# Patient Record
Sex: FEMALE | URBAN - METROPOLITAN AREA
[De-identification: names, ages, dates, MRNs, and addresses within clinical notes are randomized per-mention and may not be internally consistent; named-entity substitution may affect disease eponyms.]

---

## 2023-05-05 ENCOUNTER — APPOINTMENT (RX ONLY)
Dept: URBAN - METROPOLITAN AREA CLINIC 335 | Facility: CLINIC | Age: 38
Setting detail: DERMATOLOGY
End: 2023-05-05

## 2023-05-05 DIAGNOSIS — L65.9 NONSCARRING HAIR LOSS, UNSPECIFIED: ICD-10-CM | Status: INADEQUATELY CONTROLLED

## 2023-05-05 DIAGNOSIS — D18.0 HEMANGIOMA: ICD-10-CM

## 2023-05-05 DIAGNOSIS — D22 MELANOCYTIC NEVI: ICD-10-CM

## 2023-05-05 DIAGNOSIS — B07.8 OTHER VIRAL WARTS: ICD-10-CM | Status: INADEQUATELY CONTROLLED

## 2023-05-05 DIAGNOSIS — L57.8 OTHER SKIN CHANGES DUE TO CHRONIC EXPOSURE TO NONIONIZING RADIATION: ICD-10-CM | Status: INADEQUATELY CONTROLLED

## 2023-05-05 DIAGNOSIS — L81.4 OTHER MELANIN HYPERPIGMENTATION: ICD-10-CM

## 2023-05-05 DIAGNOSIS — L81.1 CHLOASMA: ICD-10-CM

## 2023-05-05 DIAGNOSIS — L82.1 OTHER SEBORRHEIC KERATOSIS: ICD-10-CM | Status: INADEQUATELY CONTROLLED

## 2023-05-05 PROBLEM — D23.72 OTHER BENIGN NEOPLASM OF SKIN OF LEFT LOWER LIMB, INCLUDING HIP: Status: ACTIVE | Noted: 2023-05-05

## 2023-05-05 PROBLEM — D18.01 HEMANGIOMA OF SKIN AND SUBCUTANEOUS TISSUE: Status: ACTIVE | Noted: 2023-05-05

## 2023-05-05 PROBLEM — D22.5 MELANOCYTIC NEVI OF TRUNK: Status: ACTIVE | Noted: 2023-05-05

## 2023-05-05 PROCEDURE — ? TREATMENT REGIMEN

## 2023-05-05 PROCEDURE — ? COUNSELING

## 2023-05-05 PROCEDURE — ? RECOMMENDATIONS

## 2023-05-05 PROCEDURE — ? PRODUCT LINE (REVISION)

## 2023-05-05 PROCEDURE — 99204 OFFICE O/P NEW MOD 45 MIN: CPT

## 2023-05-05 PROCEDURE — ? FULL BODY SKIN EXAM

## 2023-05-05 PROCEDURE — ? DEFER

## 2023-05-05 PROCEDURE — ? ORDER TESTS

## 2023-05-05 PROCEDURE — ? IN-HOUSE DISPENSING PHARMACY

## 2023-05-05 ASSESSMENT — LOCATION DETAILED DESCRIPTION DERM
LOCATION DETAILED: MEDIAL FRONTAL SCALP
LOCATION DETAILED: LEFT ANTERIOR SHOULDER
LOCATION DETAILED: RIGHT RADIAL DORSAL HAND
LOCATION DETAILED: LEFT DISTAL PALMAR INDEX FINGER
LOCATION DETAILED: LEFT DISTAL PALMAR SMALL FINGER
LOCATION DETAILED: EPIGASTRIC SKIN
LOCATION DETAILED: LEFT CENTRAL MALAR CHEEK
LOCATION DETAILED: LEFT MID-UPPER BACK
LOCATION DETAILED: 1ST WEB SPACE RIGHT HAND
LOCATION DETAILED: RIGHT INFERIOR MEDIAL FOREHEAD
LOCATION DETAILED: RIGHT MEDIAL BREAST 2-3:00 REGION
LOCATION DETAILED: RIGHT SUPERIOR MEDIAL MIDBACK
LOCATION DETAILED: RIGHT PROXIMAL ULNAR THUMB

## 2023-05-05 ASSESSMENT — LOCATION SIMPLE DESCRIPTION DERM
LOCATION SIMPLE: RIGHT HAND
LOCATION SIMPLE: RIGHT FOREHEAD
LOCATION SIMPLE: RIGHT BREAST
LOCATION SIMPLE: RIGHT THUMB
LOCATION SIMPLE: RIGHT LOWER BACK
LOCATION SIMPLE: LEFT UPPER BACK
LOCATION SIMPLE: LEFT CHEEK
LOCATION SIMPLE: ABDOMEN
LOCATION SIMPLE: LEFT INDEX FINGER
LOCATION SIMPLE: FRONTAL SCALP
LOCATION SIMPLE: LEFT SHOULDER
LOCATION SIMPLE: LEFT SMALL FINGER

## 2023-05-05 ASSESSMENT — LOCATION ZONE DERM
LOCATION ZONE: FACE
LOCATION ZONE: ARM
LOCATION ZONE: HAND
LOCATION ZONE: FINGER
LOCATION ZONE: TRUNK
LOCATION ZONE: SCALP

## 2023-05-05 NOTE — PROCEDURE: PRODUCT LINE (REVISION)
Product 3 Application Directions: Using a circular motion, massage into skin until fully absorbed. Apply twice daily. To see optimal results, it is recommended to exfoliate twice a week using a loofah or physical exfoliator.

## 2023-05-05 NOTE — PROCEDURE: ORDER TESTS
Billing Type: Third-Party Bill
Performing Laboratory: [id number]
Bill For Surgical Tray: no
Expected Date Of Service: 05/05/2023

## 2023-05-05 NOTE — PROCEDURE: PRODUCT LINE (REVISION)
Product 8 Application Directions: Apply to clean skin prior to moisturizer. Dispense desired amount and use applicator to massage serum onto any fine lines and wrinkles. You will feel a cooling effect from the customized metal tip. Avoid direct eye contact. Use twice daily. NOTE: A thin layer of product is all that is needed. No need for heavy application. (Packaging contains slightly different verbiage.

## 2023-05-05 NOTE — PROCEDURE: PRODUCT LINE (REVISION)
Product 4 Application Directions: Dispense two pumps into palm of hand. Using upward strokes, gently apply onto d?colletage, working your way up the neck to the jawline. Use twice daily

## 2023-05-05 NOTE — PROCEDURE: PRODUCT LINE (REVISION)
Product 5 Application Directions: Glide onto lips in a massaging motion. Use three times daily for optimal results or as needed. Use alone or layered over your favorite lip color

## 2023-05-05 NOTE — PROCEDURE: PRODUCT LINE (REVISION)
Name Of Product 2: [id number]\\nGentle Cleansing Lotion Name Of Product 2: [id number]\\nGentle Cleansing Lotion

## 2023-05-05 NOTE — PROCEDURE: IN-HOUSE DISPENSING PHARMACY
Product 9 Application Directions: Apply to affected area twice a day, three times a week
Product 36 Price/Unit (In Dollars): 0
Product 65 Unit Type: mg
Product 7 Price/Unit (In Dollars): 55
Product 16 Refills: 3
Product 6 Unit Type: ml
Product 11 Amount/Unit (Numbers Only): 30
Product 1 Unit Type: grams
Product 18 Unit Type: tablets
Name Of Product 25: Minocycline
Product 21 Price/Unit (In Dollars): 45
Product 16 Amount/Unit (Numbers Only): 60
Product 11 Refills: 2
Name Of Product 2: Acne moisturizing cream (NIACINAMIDE 4% / TRETINOIN 0.05% / HYALURONIC ACID 0.5% CREAM)
Name Of Product 19: Doxycycline
Name Of Product 9: Dermatitis cream  (CLOBETASOL PROPIONATE 0.05% / NIACINAMIDE 4%)
Product 14 Application Directions: Apply a small amount to face once daily
Product 23 Price/Unit (In Dollars): 10
Product 11 Price/Unit (In Dollars): 20
Product 25 Application Directions: Take 1 capsule twice daily for 1 week
Product 4 Amount/Unit (Numbers Only): 30
Product 19 Application Directions: Take 1 Capsule twice daily
Name Of Product 15: Anti-Fungal Shampoo ( KETOCONAZOLE 2% / SALICYLIC ACID 2% / ZINC PYRITHIONE 0.2%)
Product 2 Application Directions: Apply a thin layer to face every night
Product 8 Application Directions: Apply to affected area twice daily
Product 25 Unit Type: capsules
Name Of Product 13: Rosacea Cream (AZELAIC ACID 15% / NIACINAMIDE 4%)
Product 23 Amount/Unit (Numbers Only): 5
Name Of Product 5: Actinic Keratosis Gel (IMIQUIMOD 5% / LEVOCETIRIZINE DIHYDROCHLORIDE 1% / TRETINOIN 0.05%)
Name Of Product 22: Prednisone 20 mg
Product 17 Application Directions: Apply a small amount to affected areas twice daily
Render Product Pricing In Note: Yes
Product 10 Application Directions: Apply to affected are twice a day
Product 8 Price/Unit (In Dollars): 35
Product 15 Refills: 4
Name Of Product 24: Spironlactone
Product 3 Price/Unit (In Dollars): 40
Product 20 Price/Unit (In Dollars): 15
Name Of Product 6: Alopecia Solution (FLUOCINOLONE ACETONIDE 0.01% / MINOXIDIL 5% / TRETINOIN 0.025%)
Product 15 Amount/Unit (Numbers Only): 120
Product 22 Application Directions: Take 3 tablets by mouth for 3 days, Then take 2 tablets for 3 days, then take 1 tablet for 3 days
Name Of Product 1: Acne Gel (DAPSONE 8.5% / NIACINAMIDE 4%)
Name Of Product 18: Bactrim DS
Name Of Product 10: Fungal dermatitis cream ( (HYDROCORTISONE 2.5% / KETOCONAZOLE 2%)
Product 13 Application Directions: Apply to affected areas of the face twice daily.
Product 26 Amount/Unit (Numbers Only): 14
Product 24 Application Directions: Take 1 tablet twice daily
Product 6 Application Directions: Apply to the affected areas on the scalp Monday, Wednesday and Friday.
Product 1 Application Directions: Apply a thin layer to face every morning
Product 18 Application Directions: Take 1 tablet twice daily for 1 week
Name Of Product 14: Rosacea Silicone Gel (IVERMECTIN 1% / METRONIDAZOLE 1% / NIACINAMIDE 4% / POTASSIUM AZELOYL DIGLYCINATE 5%)
Name Of Product 4: Acne gel  (ADAPALENE 0.3% / BENZOYL PEROXIDE 2.5% / NIACINAMIDE 4%)
Name Of Product 7: Anti-fungal nail solution (CICLOPIROX 8% / FLUCONAZOLE 1% / TERBINAFINE HCL 1%)
Product 16 Application Directions: Apply a small amount to affected areas three times a week
Product 11 Application Directions: Apply to affected area twice a day
Name Of Product 23: Prednisone 50 mg
Product 2 Price/Unit (In Dollars): 50
Detail Level: Zone
Product 21 Application Directions: Take 1 capsule by mouth twice daily
Name Of Product 17: Xerosis Gel ( ALOE VERA 1% / LACTIC ACID 10% / UREA 40%)
Name Of Product 11: Antibacterial ointment ( METRONIDAZOLE 1% / MUPIROCIN 2%)
Product 23 Application Directions: Take 1 tablet every morning for 5 days
Product 5 Application Directions: Apply to the affected areas as directed by your physician. You may experience mild skin irritation, itching, dryness, flaking, scabbing, crusting, redness, or hardening of the skin where medicine was applied. It is important to \\navoid excessive sun exposure and to use sunscreen of 30 SPF or higher.
Name Of Product 26: Cephalexin
Name Of Product 3: Acne gel combo  (BENZOYL PEROXIDE 5% / CLINDAMYCIN 1% / NIACINAMIDE 4%)
Name Of Product 20: Loratadine
Name Of Product 8: Anti-fungal cream (ECONAZOLE NITRATE 1% / NIACINAMIDE 4%)
Product 15 Application Directions: Shampoo into hair three times a week
Product 12 Application Directions: Apply a thin layer of Melasma Emulsion to the entire face at night time. For ultimate results we recommend using the Melasma Emulsion with C+ correcting complex 30% , a product sold through our office. Every morning you will apply  a thin layer  of C+ correcting complex 30% to the entire face. You will do the above regimen for 2 months. After 2 months, you will temporarily discontinue the Melasma Emulsion & only use the C+ correcting complex 30% twice a day for 2 months. After the 4 months, we recommend that you have a follow up appointment with your provider to evaluate if any other changes are needed.   If the  skin gets too dry or irritated with the Melasma Emulsion, then use it every third night.  The Melasma Emulsion will make your skin more sun-sensitive. Use a sunscreen daily of at least SPF 30, also \\n reapplying throughout the day is very important. Because Melasma Emulsion not meant to be used long term due to the potential side effects, we recommend to use C+ correcting complex 30% on your off days.
Product 6 Price/Unit (In Dollars): 55
Product 12 Units Dispensed: 1
Product 22 Amount/Unit (Numbers Only): 18
Product 20 Application Directions: Take 1 tablet by mouth daily
Product 7 Application Directions: Apply to the affected nails every night. Once a week clean nails off     \\nwith alcohol or acetone.
Name Of Product 16: Dermatitis topical solution (CLOBETASOL PROPIONATE 0.05% / NIACINAMIDE 4%)
Name Of Product 12: Melasma Emulsion  (HYDROQUINONE 4% / TRETINOIN 0.025% / TRIAMCINOLONE ACETONIDE 0.025%)
Product 9 Amount/Unit (Numbers Only): 60

## 2023-05-05 NOTE — PROCEDURE: DEFER
Introduction Text (Please End With A Colon): The following procedure was deferred:
Detail Level: Simple
X Size Of Lesion In Cm (Optional): 0
Procedure To Be Performed At Next Visit: Cryotherapy

## 2023-05-05 NOTE — PROCEDURE: PRODUCT LINE (REVISION)
Product 2 Application Directions: Wet face with tepid water. Dispense a quarter-sized amount into palm of hand. Using fingertips, gently massage onto face using circular motions. Rinse thoroughly and pat skin dry. Use twice daily, morning and evening.

## 2023-05-05 NOTE — PROCEDURE: PRODUCT LINE (REVISION)
Product 1 Application Directions: Use morning and evening after cleansing, but before moisturizing. Dispense one pump into palm of hand and apply evenly to the face, avoiding the eye area.

## 2023-05-05 NOTE — PROCEDURE: PRODUCT LINE (REVISION)
Product 6 Application Directions: Use only at night. After cleansing, dispense one to two pumps on back of hand. Apply to face two to three times per week, avoiding the eye area. Increase usage as tolerated.

## 2023-05-05 NOTE — PROCEDURE: RECOMMENDATIONS
Recommendation Preamble: The following recommendations were made during the visit:
Detail Level: Zone
Render Risk Assessment In Note?: no
Recommendation Override: Clem on day off alopecia solution

## 2023-05-05 NOTE — PROCEDURE: PRODUCT LINE (REVISION)
Product 7 Application Directions: Use only at night. After cleansing, dispense one to two pumps on back of hand. Apply to face two to three times per week, avoiding the eye area. Increase usage as tolerated

## 2023-05-30 ENCOUNTER — APPOINTMENT (RX ONLY)
Dept: URBAN - METROPOLITAN AREA CLINIC 335 | Facility: CLINIC | Age: 38
Setting detail: DERMATOLOGY
End: 2023-05-30

## 2023-05-30 DIAGNOSIS — L24 IRRITANT CONTACT DERMATITIS: ICD-10-CM | Status: INADEQUATELY CONTROLLED

## 2023-05-30 DIAGNOSIS — B07.8 OTHER VIRAL WARTS: ICD-10-CM

## 2023-05-30 DIAGNOSIS — L82.1 OTHER SEBORRHEIC KERATOSIS: ICD-10-CM | Status: INADEQUATELY CONTROLLED

## 2023-05-30 PROBLEM — L24.9 IRRITANT CONTACT DERMATITIS, UNSPECIFIED CAUSE: Status: ACTIVE | Noted: 2023-05-30

## 2023-05-30 PROCEDURE — 99213 OFFICE O/P EST LOW 20 MIN: CPT | Mod: 25

## 2023-05-30 PROCEDURE — ? LIQUID NITROGEN

## 2023-05-30 PROCEDURE — ? COUNSELING

## 2023-05-30 PROCEDURE — 17110 DESTRUCTION B9 LES UP TO 14: CPT

## 2023-05-30 PROCEDURE — ? PRESCRIPTION

## 2023-05-30 RX ORDER — HYDROCORTISONE 25 MG/G
CREAM TOPICAL
Qty: 30 | Refills: 0 | Status: ERX | COMMUNITY
Start: 2023-05-30

## 2023-05-30 RX ORDER — IMIQUIMOD 12.5 MG/.25G
CREAM TOPICAL
Qty: 12 | Refills: 1 | Status: ERX | COMMUNITY
Start: 2023-05-30

## 2023-05-30 RX ADMIN — HYDROCORTISONE: 25 CREAM TOPICAL at 00:00

## 2023-05-30 RX ADMIN — IMIQUIMOD: 12.5 CREAM TOPICAL at 00:00

## 2023-05-30 ASSESSMENT — LOCATION SIMPLE DESCRIPTION DERM
LOCATION SIMPLE: LEFT MIDDLE FINGER
LOCATION SIMPLE: LEFT HAND
LOCATION SIMPLE: RIGHT BREAST
LOCATION SIMPLE: RIGHT HAND
LOCATION SIMPLE: RIGHT THUMB
LOCATION SIMPLE: CHEST
LOCATION SIMPLE: LEFT INDEX FINGER
LOCATION SIMPLE: LEFT SMALL FINGER

## 2023-05-30 ASSESSMENT — LOCATION DETAILED DESCRIPTION DERM
LOCATION DETAILED: LEFT DISTAL PALMAR SMALL FINGER
LOCATION DETAILED: RIGHT PROXIMAL ULNAR THUMB
LOCATION DETAILED: LEFT ULNAR DORSAL HAND
LOCATION DETAILED: RIGHT MEDIAL BREAST 3-4:00 REGION
LOCATION DETAILED: 1ST WEB SPACE RIGHT HAND
LOCATION DETAILED: UPPER STERNUM
LOCATION DETAILED: LEFT DISTAL PALMAR INDEX FINGER
LOCATION DETAILED: RIGHT RADIAL DORSAL HAND
LOCATION DETAILED: LEFT DISTAL PALMAR MIDDLE FINGER
LOCATION DETAILED: LEFT RADIAL DORSAL HAND

## 2023-05-30 ASSESSMENT — LOCATION ZONE DERM
LOCATION ZONE: FINGER
LOCATION ZONE: HAND
LOCATION ZONE: TRUNK

## 2023-05-30 ASSESSMENT — BSA RASH: BSA RASH: 6

## 2023-05-30 ASSESSMENT — SEVERITY ASSESSMENT 2020: SEVERITY 2020: MODERATE

## 2023-05-30 NOTE — PROCEDURE: LIQUID NITROGEN
Render Note In Bullet Format When Appropriate: No
Number Of Freeze-Thaw Cycles: 2 freeze-thaw cycles
Show Aperture Variable?: Yes
Application Tool (Optional): Liquid Nitrogen Sprayer
Medical Necessity Clause: This procedure was medically necessary because the lesions that were treated were:
Spray Paint Text: The liquid nitrogen was applied to the skin utilizing a spray paint frosting technique.
Detail Level: Simple
Duration Of Freeze Thaw-Cycle (Seconds): 3
Post-Care Instructions: I reviewed with the patient in detail post-care instructions. Patient is to wear sunprotection, and avoid picking at any of the treated lesions. Pt may apply Vaseline to crusted or scabbing areas.
Consent: The patient's written consent was obtained including but not limited to risks of crusting, scabbing, blistering, scarring, darker or lighter pigmentary change, recurrence, incomplete removal and infection.
Medical Necessity Information: It is in your best interest to select a reason for this procedure from the list below. All of these items fulfill various CMS LCD requirements except the new and changing color options.

## 2023-07-05 ENCOUNTER — APPOINTMENT (RX ONLY)
Dept: URBAN - METROPOLITAN AREA CLINIC 335 | Facility: CLINIC | Age: 38
Setting detail: DERMATOLOGY
End: 2023-07-05

## 2023-07-05 DIAGNOSIS — L24 IRRITANT CONTACT DERMATITIS: ICD-10-CM

## 2023-07-05 DIAGNOSIS — L57.0 ACTINIC KERATOSIS: ICD-10-CM | Status: INADEQUATELY CONTROLLED

## 2023-07-05 DIAGNOSIS — L82.1 OTHER SEBORRHEIC KERATOSIS: ICD-10-CM

## 2023-07-05 DIAGNOSIS — L72.0 EPIDERMAL CYST: ICD-10-CM | Status: STABLE

## 2023-07-05 DIAGNOSIS — B07.8 OTHER VIRAL WARTS: ICD-10-CM | Status: IMPROVED

## 2023-07-05 PROBLEM — L24.9 IRRITANT CONTACT DERMATITIS, UNSPECIFIED CAUSE: Status: ACTIVE | Noted: 2023-07-05

## 2023-07-05 PROCEDURE — ? PRESCRIPTION MEDICATION MANAGEMENT

## 2023-07-05 PROCEDURE — 99213 OFFICE O/P EST LOW 20 MIN: CPT | Mod: 25

## 2023-07-05 PROCEDURE — 17110 DESTRUCTION B9 LES UP TO 14: CPT

## 2023-07-05 PROCEDURE — ? DEFER

## 2023-07-05 PROCEDURE — ? PRESCRIPTION

## 2023-07-05 PROCEDURE — ? LIQUID NITROGEN

## 2023-07-05 PROCEDURE — 17000 DESTRUCT PREMALG LESION: CPT | Mod: 59

## 2023-07-05 PROCEDURE — 17003 DESTRUCT PREMALG LES 2-14: CPT | Mod: 59

## 2023-07-05 PROCEDURE — ? COUNSELING

## 2023-07-05 RX ORDER — TRIAMCINOLONE ACETONIDE 1 MG/G
CREAM TOPICAL
Qty: 80 | Refills: 1 | Status: ERX | COMMUNITY
Start: 2023-07-05

## 2023-07-05 RX ORDER — TACROLIMUS 1 MG/G
OINTMENT TOPICAL
Qty: 100 | Refills: 1 | Status: ERX | COMMUNITY
Start: 2023-07-05

## 2023-07-05 RX ADMIN — TRIAMCINOLONE ACETONIDE: 1 CREAM TOPICAL at 00:00

## 2023-07-05 RX ADMIN — TACROLIMUS: 1 OINTMENT TOPICAL at 00:00

## 2023-07-05 ASSESSMENT — LOCATION ZONE DERM
LOCATION ZONE: HAND
LOCATION ZONE: FACE
LOCATION ZONE: TRUNK
LOCATION ZONE: FINGER
LOCATION ZONE: EYELID

## 2023-07-05 ASSESSMENT — LOCATION DETAILED DESCRIPTION DERM
LOCATION DETAILED: LEFT DISTAL PALMAR SMALL FINGER
LOCATION DETAILED: LEFT MEDIAL SUPERIOR CHEST
LOCATION DETAILED: LEFT MEDIAL EYEBROW
LOCATION DETAILED: UPPER STERNUM
LOCATION DETAILED: LEFT RADIAL DORSAL HAND
LOCATION DETAILED: RIGHT PROXIMAL ULNAR THUMB
LOCATION DETAILED: RIGHT MEDIAL SUPERIOR EYELID

## 2023-07-05 ASSESSMENT — LOCATION SIMPLE DESCRIPTION DERM
LOCATION SIMPLE: RIGHT SUPERIOR EYELID
LOCATION SIMPLE: LEFT SMALL FINGER
LOCATION SIMPLE: LEFT EYEBROW
LOCATION SIMPLE: LEFT HAND
LOCATION SIMPLE: RIGHT THUMB
LOCATION SIMPLE: CHEST

## 2023-07-05 NOTE — PROCEDURE: PRESCRIPTION MEDICATION MANAGEMENT
Detail Level: Zone
Initiate Treatment: Melasma emulsion- apply to the spots on hands nightly
Render In Strict Bullet Format?: No

## 2023-07-05 NOTE — PROCEDURE: DEFER
Introduction Text (Please End With A Colon): The following procedure was deferred:
Size Of Lesion In Cm (Optional): 0
Detail Level: Simple
Procedure To Be Performed At Next Visit: Cautery

## 2023-07-05 NOTE — PROCEDURE: LIQUID NITROGEN
Render Note In Bullet Format When Appropriate: No
Number Of Freeze-Thaw Cycles: 2 freeze-thaw cycles
Show Aperture Variable?: Yes
Application Tool (Optional): Liquid Nitrogen Sprayer
Medical Necessity Clause: This procedure was medically necessary because the lesions that were treated were:
Spray Paint Text: The liquid nitrogen was applied to the skin utilizing a spray paint frosting technique.
Detail Level: Simple
Duration Of Freeze Thaw-Cycle (Seconds): 3
Post-Care Instructions: I reviewed with the patient in detail post-care instructions. Patient is to wear sunprotection, and avoid picking at any of the treated lesions. Pt may apply Vaseline to crusted or scabbing areas.
Consent: The patient's written consent was obtained including but not limited to risks of crusting, scabbing, blistering, scarring, darker or lighter pigmentary change, recurrence, incomplete removal and infection.
Medical Necessity Information: It is in your best interest to select a reason for this procedure from the list below. All of these items fulfill various CMS LCD requirements except the new and changing color options.
Consent: The patient's consent was obtained including but not limited to risks of crusting, scabbing, blistering, scarring, darker or lighter pigmentary change, recurrence, incomplete removal and infection.

## 2023-07-27 ENCOUNTER — APPOINTMENT (RX ONLY)
Dept: URBAN - METROPOLITAN AREA CLINIC 335 | Facility: CLINIC | Age: 38
Setting detail: DERMATOLOGY
End: 2023-07-27

## 2023-07-27 DIAGNOSIS — L82.0 INFLAMED SEBORRHEIC KERATOSIS: ICD-10-CM | Status: INADEQUATELY CONTROLLED

## 2023-07-27 DIAGNOSIS — L81.1 CHLOASMA: ICD-10-CM | Status: IMPROVED

## 2023-07-27 DIAGNOSIS — B07.8 OTHER VIRAL WARTS: ICD-10-CM

## 2023-07-27 DIAGNOSIS — L81.0 POSTINFLAMMATORY HYPERPIGMENTATION: ICD-10-CM | Status: INADEQUATELY CONTROLLED

## 2023-07-27 DIAGNOSIS — L57.0 ACTINIC KERATOSIS: ICD-10-CM | Status: RESOLVING

## 2023-07-27 DIAGNOSIS — L24 IRRITANT CONTACT DERMATITIS: ICD-10-CM | Status: IMPROVED

## 2023-07-27 PROBLEM — D48.5 NEOPLASM OF UNCERTAIN BEHAVIOR OF SKIN: Status: ACTIVE | Noted: 2023-07-27

## 2023-07-27 PROBLEM — L24.9 IRRITANT CONTACT DERMATITIS, UNSPECIFIED CAUSE: Status: ACTIVE | Noted: 2023-07-27

## 2023-07-27 PROCEDURE — ? PRESCRIPTION

## 2023-07-27 PROCEDURE — ? DEFER

## 2023-07-27 PROCEDURE — 99213 OFFICE O/P EST LOW 20 MIN: CPT

## 2023-07-27 PROCEDURE — ? PRESCRIPTION MEDICATION MANAGEMENT

## 2023-07-27 PROCEDURE — ? COUNSELING

## 2023-07-27 RX ORDER — HYDROCORTISONE 25 MG/G
CREAM TOPICAL
Qty: 30 | Refills: 2 | Status: ERX

## 2023-07-27 ASSESSMENT — LOCATION SIMPLE DESCRIPTION DERM
LOCATION SIMPLE: CHEST
LOCATION SIMPLE: LEFT CHEEK
LOCATION SIMPLE: RIGHT THUMB
LOCATION SIMPLE: LEFT SMALL FINGER
LOCATION SIMPLE: LEFT HAND

## 2023-07-27 ASSESSMENT — LOCATION ZONE DERM
LOCATION ZONE: FINGER
LOCATION ZONE: HAND
LOCATION ZONE: FACE
LOCATION ZONE: TRUNK

## 2023-07-27 ASSESSMENT — LOCATION DETAILED DESCRIPTION DERM
LOCATION DETAILED: LEFT CENTRAL MALAR CHEEK
LOCATION DETAILED: MIDDLE STERNUM
LOCATION DETAILED: LEFT DISTAL RADIAL PALMAR SMALL FINGER
LOCATION DETAILED: RIGHT PROXIMAL ULNAR THUMB
LOCATION DETAILED: LEFT MEDIAL SUPERIOR CHEST
LOCATION DETAILED: RIGHT MEDIAL SUPERIOR CHEST
LOCATION DETAILED: UPPER STERNUM
LOCATION DETAILED: LEFT RADIAL DORSAL HAND

## 2023-07-27 NOTE — PROCEDURE: DEFER
Detail Level: Simple
X Size Of Lesion In Cm (Optional): 0
Procedure To Be Performed At Next Visit: Biopsy by punch method
Other Procedure: 2mm and 3mm
Introduction Text (Please End With A Colon): The following procedure was deferred:
Procedure To Be Performed At Next Visit: Cryotherapy

## 2023-07-27 NOTE — PROCEDURE: PRESCRIPTION MEDICATION MANAGEMENT
Render In Strict Bullet Format?: No
Detail Level: Zone
Continue Regimen: triamcinolone acetonide 0.1 % topical cream \\nQuantity: 80.0 g\\nSig: Apply a small amount to affected areas twice daily on Monday Wednesday Friday.
Discontinue Regimen: Melasma for two months on face
Initiate Treatment: Azelaic acid - twice daily for the face
Continue Regimen: Vitamin c - once daily
Initiate Treatment: Melasma - once nightly for hand

## 2023-07-27 NOTE — PROCEDURE: COUNSELING
Detail Level: Zone
Detail Level: Detailed
Sunscreen Recommendations: SPF 30 or greater
Detail Level: Simple

## 2023-07-31 ENCOUNTER — APPOINTMENT (RX ONLY)
Dept: URBAN - METROPOLITAN AREA CLINIC 335 | Facility: CLINIC | Age: 38
Setting detail: DERMATOLOGY
End: 2023-07-31

## 2023-07-31 DIAGNOSIS — L82.0 INFLAMED SEBORRHEIC KERATOSIS: ICD-10-CM | Status: INADEQUATELY CONTROLLED

## 2023-07-31 DIAGNOSIS — B07.8 OTHER VIRAL WARTS: ICD-10-CM | Status: INADEQUATELY CONTROLLED

## 2023-07-31 PROBLEM — D48.5 NEOPLASM OF UNCERTAIN BEHAVIOR OF SKIN: Status: ACTIVE | Noted: 2023-07-31

## 2023-07-31 PROCEDURE — ? BIOPSY BY PUNCH METHOD

## 2023-07-31 PROCEDURE — 11104 PUNCH BX SKIN SINGLE LESION: CPT

## 2023-07-31 PROCEDURE — 17110 DESTRUCTION B9 LES UP TO 14: CPT | Mod: 59

## 2023-07-31 PROCEDURE — ? LIQUID NITROGEN

## 2023-07-31 PROCEDURE — ? COUNSELING

## 2023-07-31 PROCEDURE — 11105 PUNCH BX SKIN EA SEP/ADDL: CPT

## 2023-07-31 ASSESSMENT — LOCATION SIMPLE DESCRIPTION DERM
LOCATION SIMPLE: RIGHT THUMB
LOCATION SIMPLE: LEFT SMALL FINGER
LOCATION SIMPLE: CHEST

## 2023-07-31 ASSESSMENT — LOCATION DETAILED DESCRIPTION DERM
LOCATION DETAILED: LEFT LATERAL SUPERIOR CHEST
LOCATION DETAILED: RIGHT LATERAL SUPERIOR CHEST
LOCATION DETAILED: UPPER STERNUM
LOCATION DETAILED: RIGHT PROXIMAL ULNAR THUMB
LOCATION DETAILED: LEFT DISTAL PALMAR SMALL FINGER
LOCATION DETAILED: RIGHT MEDIAL SUPERIOR CHEST
LOCATION DETAILED: MIDDLE STERNUM
LOCATION DETAILED: LEFT MEDIAL SUPERIOR CHEST

## 2023-07-31 ASSESSMENT — LOCATION ZONE DERM
LOCATION ZONE: FINGER
LOCATION ZONE: TRUNK

## 2023-07-31 NOTE — PROCEDURE: LIQUID NITROGEN
Consent: The patient's written consent was obtained including but not limited to risks of crusting, scabbing, blistering, scarring, darker or lighter pigmentary change, recurrence, incomplete removal and infection.
Include Z78.9 (Other Specified Conditions Influencing Health Status) As An Associated Diagnosis?: Yes
Application Tool (Optional): Liquid Nitrogen Sprayer
Add 52 Modifier (Optional): no
Number Of Freeze-Thaw Cycles: 2 freeze-thaw cycles
Detail Level: Simple
Medical Necessity Clause: This procedure was medically necessary because the lesions that were treated were:
Spray Paint Text: The liquid nitrogen was applied to the skin utilizing a spray paint frosting technique.
Medical Necessity Information: It is in your best interest to select a reason for this procedure from the list below. All of these items fulfill various CMS LCD requirements except the new and changing color options.
Duration Of Freeze Thaw-Cycle (Seconds): 3
Post-Care Instructions: I reviewed with the patient in detail post-care instructions. Patient is to wear sunprotection, and avoid picking at any of the treated lesions. Pt may apply Vaseline to crusted or scabbing areas.

## 2024-04-11 ENCOUNTER — RX ONLY (OUTPATIENT)
Age: 39
Setting detail: RX ONLY
End: 2024-04-11

## 2024-04-11 RX ORDER — HYDROQUINO/TRETINOIN/TRIAMCINO 4 %-0.025%
EMULSION (GRAM) TOPICAL
Qty: 30 | Refills: 0 | Status: CANCELLED | COMMUNITY
Start: 2024-04-11

## 2024-04-11 RX ORDER — HYDROQUINO/TRETINOIN/TRIAMCINO 4 %-0.025%
EMULSION (GRAM) TOPICAL
Qty: 30 | Refills: 0 | Status: ERX